# Patient Record
Sex: MALE | Race: WHITE | URBAN - METROPOLITAN AREA
[De-identification: names, ages, dates, MRNs, and addresses within clinical notes are randomized per-mention and may not be internally consistent; named-entity substitution may affect disease eponyms.]

---

## 2018-05-30 ENCOUNTER — OFFICE VISIT (OUTPATIENT)
Dept: FAMILY MEDICINE | Facility: CLINIC | Age: 28
End: 2018-05-30
Payer: COMMERCIAL

## 2018-05-30 VITALS
DIASTOLIC BLOOD PRESSURE: 74 MMHG | HEIGHT: 67 IN | BODY MASS INDEX: 23.23 KG/M2 | SYSTOLIC BLOOD PRESSURE: 136 MMHG | OXYGEN SATURATION: 99 % | RESPIRATION RATE: 18 BRPM | WEIGHT: 148 LBS | HEART RATE: 91 BPM | TEMPERATURE: 98.6 F

## 2018-05-30 DIAGNOSIS — F41.1 GAD (GENERALIZED ANXIETY DISORDER): ICD-10-CM

## 2018-05-30 DIAGNOSIS — F33.1 MAJOR DEPRESSIVE DISORDER, RECURRENT EPISODE, MODERATE (H): ICD-10-CM

## 2018-05-30 DIAGNOSIS — Z00.00 ROUTINE GENERAL MEDICAL EXAMINATION AT A HEALTH CARE FACILITY: Primary | ICD-10-CM

## 2018-05-30 DIAGNOSIS — R41.840 CONCENTRATION DEFICIT: ICD-10-CM

## 2018-05-30 PROCEDURE — 80061 LIPID PANEL: CPT | Performed by: NURSE PRACTITIONER

## 2018-05-30 PROCEDURE — 82947 ASSAY GLUCOSE BLOOD QUANT: CPT | Performed by: NURSE PRACTITIONER

## 2018-05-30 PROCEDURE — 36415 COLL VENOUS BLD VENIPUNCTURE: CPT | Performed by: NURSE PRACTITIONER

## 2018-05-30 PROCEDURE — 99385 PREV VISIT NEW AGE 18-39: CPT | Performed by: NURSE PRACTITIONER

## 2018-05-30 RX ORDER — BUPROPION HYDROCHLORIDE 150 MG/1
150 TABLET ORAL DAILY
Refills: 1 | COMMUNITY
Start: 2018-05-02

## 2018-05-30 RX ORDER — PRAZOSIN HYDROCHLORIDE 1 MG/1
3 CAPSULE ORAL DAILY
Refills: 1 | COMMUNITY
Start: 2018-03-22

## 2018-05-30 RX ORDER — BUPROPION HYDROCHLORIDE 300 MG/1
300 TABLET ORAL DAILY
Refills: 1 | COMMUNITY
Start: 2018-05-02

## 2018-05-30 RX ORDER — VALACYCLOVIR HYDROCHLORIDE 500 MG/1
500 TABLET, FILM COATED ORAL DAILY
Refills: 11 | COMMUNITY
Start: 2018-05-02

## 2018-05-30 RX ORDER — QUETIAPINE FUMARATE 25 MG/1
25 TABLET, FILM COATED ORAL DAILY
Refills: 1 | COMMUNITY
Start: 2018-05-02

## 2018-05-30 NOTE — LETTER
June 6, 2018      Vernon Hughes  [address] [facility] [address]        Dear ,    We are writing to inform you of your test results.    Your cholesterol and glucose are normal.      Please let me know if you have any questions.     Resulted Orders   Lipid Profile (Chol, Trig, HDL, LDL calc)   Result Value Ref Range    Cholesterol 157 <200 mg/dL    Triglycerides 140 <150 mg/dL      Comment:      Non Fasting    HDL Cholesterol 45 >39 mg/dL    LDL Cholesterol Calculated 84 <100 mg/dL      Comment:      Desirable:       <100 mg/dl    Non HDL Cholesterol 112 <130 mg/dL   Glucose   Result Value Ref Range    Glucose 92 70 - 99 mg/dL      Comment:      Non Fasting     If you have any questions or concerns, please call the clinic at the number listed above.     Sincerely,  Jeanine Mendez, CYNTHIA CNP/nr

## 2018-05-30 NOTE — MR AVS SNAPSHOT
After Visit Summary   5/30/2018    Vernon Hughes    MRN: [id number]           Patient Information     Date Of Birth          1990        Visit Information        Provider Department      5/30/2018 1:00 PM Jeanine Mendez APRN [facility]        Today's Diagnoses     Routine general medical examination at a health care facility    -  1    Screening for HIV (human immunodeficiency virus)        Need for prophylactic vaccination with tetanus-diphtheria (TD)        Concentration deficit        Major depressive disorder, recurrent episode, moderate (H)        XIOMY (generalized anxiety disorder)          Care Instructions      Preventive Health Recommendations  Male Ages 26 - 39    Yearly exam:             See your health care provider every year in order to  o   Review health changes.   o   Discuss preventive care.    o   Review your medicines if your doctor has prescribed any.    You should be tested each year for STDs (sexually transmitted diseases), if you re at risk.     After age 35, talk to your provider about cholesterol testing. If you are at risk for heart disease, have your cholesterol tested at least every 5 years.     If you are at risk for diabetes, you should have a diabetes test (fasting glucose).  Shots: Get a flu shot each year. Get a tetanus shot every 10 years.     Nutrition:    Eat at least 5 servings of fruits and vegetables daily.     Eat whole-grain bread, whole-wheat pasta and brown rice instead of white grains and rice.     Talk to your provider about Calcium and Vitamin D.     Lifestyle    Exercise for at least 150 minutes a week (30 minutes a day, 5 days a week). This will help you control your weight and prevent disease.     Limit alcohol to one drink per day.     No smoking.     Wear sunscreen to prevent skin cancer.     See your dentist every six months for an exam and cleaning.             Follow-ups after your visit        Additional Services  "    MENTAL HEALTH REFERRAL  - Adult; Psychiatry and Medication Management; Psychiatry; Other: [facility] [phone]; We will contact you to schedule the appointment or please call with any questions       All scheduling is subject to the client's specific insurance plan & benefits, provider/location availability, and provider clinical specialities.  Please arrive 15 minutes early for your first appointment and bring your completed paperwork.    Please be aware that coverage of these services is subject to the terms and limitations of your health insurance plan.  Call member services at your health plan with any benefit or coverage questions.                            Who to contact     If you have questions or need follow up information about today's clinic visit or your schedule please contact [facility] directly at [phone].  Normal or non-critical lab and imaging results will be communicated to you by MyChart, letter or phone within 4 business days after the clinic has received the results. If you do not hear from us within 7 days, please contact the clinic through MyChart or phone. If you have a critical or abnormal lab result, we will notify you by phone as soon as possible.  Submit refill requests through [employer] or call your pharmacy and they will forward the refill request to us. Please allow 3 business days for your refill to be completed.          Additional Information About Your Visit        Care EveryWhere ID     This is your Care EveryWhere ID. This could be used by other organizations to access your [facility] medical records  [id number]        Your Vitals Were     Pulse Temperature Respirations Height Pulse Oximetry BMI (Body Mass Index)    91 98.6  F (37  C) (Oral) 18 5' 7\" (1.702 m) 99% 23.18 kg/m2       Blood Pressure from Last 3 Encounters:   05/30/18 136/74    Weight from Last 3 Encounters:   05/30/18 148 lb (67.1 kg)              We Performed " the Following     Glucose     Lipid Profile (Chol, Trig, HDL, LDL calc)     MENTAL HEALTH REFERRAL  - Adult; Psychiatry and Medication Management; Psychiatry; Other: [facility] [phone]; We will contact you to schedule the appointment or please call with any questions        Primary Care Provider Office Phone # Fax #    CYNTHIA Mosqueda [phone] [address] [facility] [address]        Equal Access to Services     FLORI MCMAHON : Hadii aad ku hadasho Soomaali, waaxda luqadaha, qaybta kaalmada adeegyada, waxay idiin hayaan adeeg cecyarabinta lakrunal . So [facility] [phone].    ATENCIÓN: Si habla español, tiene a randall disposición servicios gratuitos de asistencia lingüística. [facility] [phone].    We comply with applicable federal civil rights laws and [place] laws. We do not discriminate on the basis of race, color, national origin, age, disability, sex, sexual orientation, or gender identity.            Thank you!     Thank you for choosing [facility]  for your care. Our goal is always to provide you with excellent care. Hearing back from our patients is one way we can continue to improve our services. Please take a few minutes to complete the written survey that you may receive in the mail after your visit with us. Thank you!             Your Updated Medication List - Protect others around you: Learn how to safely use, store and throw away your medicines at www.disposemymeds.org.          This list is accurate as of 5/30/18  1:38 PM.  Always use your most recent med list.                   Brand Name Dispense Instructions for use Diagnosis    * buPROPion 300 MG 24 hr tablet    WELLBUTRIN XL     Take 300 mg by mouth daily        * buPROPion 150 MG 24 hr tablet    WELLBUTRIN XL     Take 150 mg by mouth daily        prazosin 1 MG capsule    MINIPRESS     Take 3 capsules by mouth daily        QUEtiapine 25 MG tablet    SEROquel      Take 25 mg by mouth daily        valACYclovir 500 MG tablet    VALTREX     Take 500 mg by mouth daily        * Notice:  This list has 2 medication(s) that are the same as other medications prescribed for you. Read the directions carefully, and ask your doctor or other care provider to review them with you.

## 2018-05-30 NOTE — PROGRESS NOTES
SUBJECTIVE:   CC: Vernon Hughes is an 27 year old male who presents for preventative health visit.     Physical   Annual:     Getting at least 3 servings of Calcium per day::  NO    Bi-annual eye exam::  Yes    Dental care twice a year::  NO    Sleep apnea or symptoms of sleep apnea::  None    Diet::  Regular (no restrictions)    Frequency of exercise::  2-3 days/week    Duration of exercise::  30-45 minutes    Taking medications regularly::  No    Barriers to taking medications::  Problems remembering to take them    Additional concerns today::  YES            Pt wants mental health referral to discuss/monitor his ADHD medication.      Today's PHQ-2 Score:   PHQ-2 ( 1999 [employer]) 5/30/2018   Q1: Little interest or pleasure in doing things 1   Q2: Feeling down, depressed or hopeless 1   PHQ-2 Score 2   Q1: Little interest or pleasure in doing things Several days   Q2: Feeling down, depressed or hopeless Several days   PHQ-2 Score 2       Abuse: Current or Past(Physical, Sexual or Emotional)- No  Do you feel safe in your environment - Yes    Social History   Substance Use Topics     Smoking status: Current Every Day Smoker     Packs/day: 0.50     Types: Cigarettes     Smokeless tobacco: Current User     Types: Chew     Alcohol use Not on file     Alcohol Use 5/30/2018   If you drink alcohol do you typically have greater than 3 drinks per day OR greater than 7 drinks per week? No   No flowsheet data found.    Last PSA: No results found for: PSA    Reviewed orders with patient. Reviewed health maintenance and updated orders accordingly - Yes  Labs reviewed in EPIC    Reviewed and updated as needed this visit by clinical staff  Tobacco  Allergies  Med Hx  Surg Hx  Fam Hx  Soc Hx        Reviewed and updated as needed this visit by Provider  Med Hx  Surg Hx  Fam Hx            Review of Systems  CONSTITUTIONAL: NEGATIVE for fever, chills, change in weight  INTEGUMENTARY/SKIN: NEGATIVE for worrisome rashes, moles  "or lesions  EYES: NEGATIVE for vision changes or irritation  ENT: NEGATIVE for ear, mouth and throat problems  RESP: NEGATIVE for significant cough or SOB  CV: NEGATIVE for chest pain, palpitations or peripheral edema  GI: NEGATIVE for nausea, abdominal pain, heartburn, or change in bowel habits   male: negative for dysuria, hematuria, decreased urinary stream, erectile dysfunction, urethral discharge  MUSCULOSKELETAL: NEGATIVE for significant arthralgias or myalgia  NEURO: NEGATIVE for weakness, dizziness or paresthesias  PSYCHIATRIC: NEGATIVE for changes in mood or affect    OBJECTIVE:   /74 (BP Location: Left arm, Patient Position: Sitting, Cuff Size: Adult Regular)  Pulse 91  Temp 98.6  F (37  C) (Oral)  Resp 18  Ht 5' 7\" (1.702 m)  Wt 148 lb (67.1 kg)  SpO2 99%  BMI 23.18 kg/m2    Physical Exam  GENERAL: healthy, alert and no distress  EYES: Eyes grossly normal to inspection, PERRL and conjunctivae and sclerae normal  HENT: ear canals and TM's normal, nose and mouth without ulcers or lesions  NECK: no adenopathy, no asymmetry, masses, or scars and thyroid normal to palpation  RESP: lungs clear to auscultation - no rales, rhonchi or wheezes  CV: regular rate and rhythm, normal S1 S2, no S3 or S4, no murmur, click or rub, no peripheral edema and peripheral pulses strong  ABDOMEN: soft, nontender, no hepatosplenomegaly, no masses and bowel sounds normal  MS: no gross musculoskeletal defects noted, no edema  SKIN: no suspicious lesions or rashes  NEURO: Normal strength and tone, mentation intact and speech normal  PSYCH: mentation appears normal, affect normal/bright    ASSESSMENT/PLAN:       ICD-10-CM    1. Routine general medical examination at a health care facility Z00.00 Lipid Profile (Chol, Trig, HDL, LDL calc)     Glucose   2. Concentration deficit R41.840 MENTAL HEALTH REFERRAL  - Adult; Psychiatry and Medication Management; Psychiatry; Other: [facility] [phone]; " "We will contact you to schedule the appointment or please call with any questions   3. Major depressive disorder, recurrent episode, moderate (H) F33.1 MENTAL HEALTH REFERRAL  - Adult; Psychiatry and Medication Management; Psychiatry; Other: [facility] [phone]; We will contact you to schedule the appointment or please call with any questions   4. XIOMY (generalized anxiety disorder) F41.1 MENTAL HEALTH REFERRAL  - Adult; Psychiatry and Medication Management; Psychiatry; Other: [facility] [phone]; We will contact you to schedule the appointment or please call with any questions      well male,  fasting for labs.  Encouraged to continue exercise and healthy diet choices.      Refer to psychiatry for ongoing  Management will get L[place] from [facility] and [place] from prior care.      COUNSELING:   Reviewed preventive health counseling, as reflected in patient instructions         reports that he has been smoking Cigarettes.  He has been smoking about 0.50 packs per day. His smokeless tobacco use includes Chew.  Tobacco Cessation Action Plan: Information offered: Patient not interested at this time  Estimated body mass index is 23.18 kg/(m^2) as calculated from the following:    Height as of this encounter: 5' 7\" (1.702 m).    Weight as of this encounter: 148 lb (67.1 kg).       Counseling Resources:  ATP IV Guidelines  Pooled Cohorts Equation Calculator  FRAX Risk Assessment  ICSI Preventive Guidelines  Dietary Guidelines for Americans, 2010  USDA's MyPlate  ASA Prophylaxis  Lung CA Screening    CYNTHIA Mosqueda CNP  [facility]  Answers for HPI/ROS submitted by the patient on 5/30/2018   PHQ-2 Score: 2    "

## 2018-05-31 LAB
CHOLEST SERPL-MCNC: 157 MG/DL
GLUCOSE SERPL-MCNC: 92 MG/DL (ref 70–99)
HDLC SERPL-MCNC: 45 MG/DL
LDLC SERPL CALC-MCNC: 84 MG/DL
NONHDLC SERPL-MCNC: 112 MG/DL
TRIGL SERPL-MCNC: 140 MG/DL